# Patient Record
Sex: FEMALE | Race: WHITE | NOT HISPANIC OR LATINO | Employment: OTHER | URBAN - METROPOLITAN AREA
[De-identification: names, ages, dates, MRNs, and addresses within clinical notes are randomized per-mention and may not be internally consistent; named-entity substitution may affect disease eponyms.]

---

## 2019-10-29 ENCOUNTER — TELEPHONE (OUTPATIENT)
Dept: FAMILY MEDICINE | Facility: CLINIC | Age: 59
End: 2019-10-29

## 2019-10-29 NOTE — TELEPHONE ENCOUNTER
Spoke with pt. Informed pt clinic has flu but not shingles vaccine. Pt verbalized an understanding.   Requested to cancel appt. Will call to reschedule at another time.     ----- Message from Josefina Smith sent at 10/29/2019 11:28 AM CDT -----  Contact: self [phone]  She wants to know if you will be able to administer the shingles shot and the flu shot at her visit on 11/1.  Please call her.  Thank you!